# Patient Record
Sex: FEMALE | Race: WHITE | Employment: FULL TIME | URBAN - METROPOLITAN AREA
[De-identification: names, ages, dates, MRNs, and addresses within clinical notes are randomized per-mention and may not be internally consistent; named-entity substitution may affect disease eponyms.]

---

## 2020-08-28 PROBLEM — F41.9 ANXIETY: Status: ACTIVE | Noted: 2020-08-28

## 2020-08-28 PROBLEM — A48.1 LEGIONELLA PNEUMONIA (HCC): Status: ACTIVE | Noted: 2020-08-28

## 2020-08-28 PROBLEM — G93.41 ACUTE METABOLIC ENCEPHALOPATHY: Status: ACTIVE | Noted: 2020-08-28

## 2023-05-11 ENCOUNTER — OFFICE VISIT (OUTPATIENT)
Dept: SURGERY | Age: 68
End: 2023-05-11

## 2023-05-11 VITALS — TEMPERATURE: 97 F

## 2023-05-11 DIAGNOSIS — R23.8 FACIAL AGING: Primary | ICD-10-CM

## 2023-05-11 PROCEDURE — DM00205 PR OFFICE/OUTPT VISIT,PROCEDURE ONLY: Performed by: PLASTIC SURGERY

## 2023-05-11 PROCEDURE — DM00150 PR DERM ONLY BOTOX INJ LEVEL 6: Performed by: PLASTIC SURGERY

## 2023-05-11 NOTE — PROGRESS NOTES
Botox 40 units   LOT [id number]  EXP 04/2025    Juvederm Ultra   LOT [id number]  EXP 12/22/23    Bacteriostatic 0.9% sodium chloride  LOT -[id number]  EXP 01 JUN 2024  . [id number]

## 2023-05-11 NOTE — PROGRESS NOTES
Botulinum Toxin Injection Procedure Note:      The risks, benefits and options were discussed with the pt. The risks included but not limited to pain, bleeding, infection, asymmetry,  and need for further procedures. The patient understands that there is a risk for upper eyelid ptosis. There may be a need for touch up injections and follow up at 2 weeks is encouraged. All of Her questions were answered to Her satisfaction and She agrees to proceed with the operation. The bilateral crows feet was cleansed with alcohol. Ice was used to numb the area. The different FDA approved brands of botulinum toxin A were discussed with the patient, Botox was agreed upon and reconstituted in a concentration of 1 unit/ 0.01cc with sterile injectable saline. 40 units were injected into the areas. There was pinpoint bleeding and local redness. The patient tolerated the procedure well. The patient was counseled to use ice for the next hour and limit strenuous activity for 24 hours. Injectable Filler Procedure Note:    Patient reports today for Injectable fillers to NLF and lateral mouth dimple areas on the face. The risks, benefits and options were discussed with the pt. The risks included but not limited to pain, bleeding, bruising, allergic reactions, infection, rare risk of blindness, asymmetry, and need for further procedures. All of Her questions were answered to She satisfaction and She agrees to proceed with the procedure. The area was cleansed with alcohol and the desired region of filling was marked. A dental block was not used. After cooling the skin with ice:     a 1cc syringe of [employer] Ultra (agreed upon with the patient) was used with a 27 gauge needle to crosshatch the product and thereby gain filling of the soft tissues. The product did  contain xylocaine within. The injected areas were gently massaged. The patient tolerated the procedure well without complications.     The patient was educated to